# Patient Record
Sex: MALE
[De-identification: names, ages, dates, MRNs, and addresses within clinical notes are randomized per-mention and may not be internally consistent; named-entity substitution may affect disease eponyms.]

---

## 2022-01-31 ENCOUNTER — HOSPITAL ENCOUNTER (EMERGENCY)
Dept: HOSPITAL 5 - ED | Age: 37
Discharge: HOME | End: 2022-01-31
Payer: SELF-PAY

## 2022-01-31 VITALS — SYSTOLIC BLOOD PRESSURE: 127 MMHG | DIASTOLIC BLOOD PRESSURE: 80 MMHG

## 2022-01-31 DIAGNOSIS — S60.512A: ICD-10-CM
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
## 2022-01-31 DIAGNOSIS — F43.21: Primary | ICD-10-CM
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
## 2022-01-31 PROCEDURE — 99282 EMERGENCY DEPT VISIT SF MDM: CPT

## 2022-01-31 NOTE — EMERGENCY DEPARTMENT REPORT
ED General Adult HPI





- General


Chief complaint: Weakness


Stated complaint: MEDICAL SCREENING


Time Seen by Provider: 22 16:22


Source: patient


Mode of arrival: Ambulatory


Limitations: No Limitations





- History of Present Illness


Initial comments: 





Patient presents because his hands are red and swollen.  He actually came in 

because he was found shooting up drugs in a bathroom at [employer].  Police were

called.  Police offered prison versus the hospital.  The patient elected the 

hospital and EMS was called.  Patient states that his hands are red and swollen.

 Right is worse than left.  Is been going on for months.  He does not know what 

is causing it.  He was not shooting up in his hands.  At one point he was told 

that he had MRSA.  He was placed on some topical medicines which greatly 

improved his symptoms.  That was years ago.  He has had a recurrence of this 

recently.  He has no chest pain or shortness of breath.  Is no fevers or chills 

but is no cough or congestion.  Has no vomit or diarrhea.  He does report that 

he is still upset because his brother just  from endocarditis related to IV 

drug abuse.





- Related Data


                                  Previous Rx's











 Medication  Instructions  Recorded  Last Taken  Type


 


Doxycycline Monohydrate 100 mg PO BID #20 tab 22 Unknown Rx


 


Mupirocin [Bactroban 2%] 1 applic TP HS #1 tube 22 Unknown Rx














ED Review of Systems


ROS: 


Stated complaint: MEDICAL SCREENING


Other details as noted in HPI





Comment: All other systems reviewed and negative


Constitutional: denies: fever


Eyes: denies: eye pain


ENT: denies: throat pain


Respiratory: denies: cough


Cardiovascular: denies: chest pain


Endocrine: denies: unexplained weight loss


Gastrointestinal: denies: abdominal pain


Genitourinary: denies: dysuria


Musculoskeletal: denies: back pain


Skin: as per HPI


Neurological: denies: headache


Hematological/Lymphatic: denies: easy bruising





ED Past Medical Hx





- Past Medical History


Previous Medical History?: No


Additional medical history: IV drug abuse





- Surgical History


Past Surgical History?: No





- Family History


Family history: other (IV drug abuse)





- Medications


Home Medications: 


                                Home Medications











 Medication  Instructions  Recorded  Confirmed  Last Taken  Type


 


Doxycycline Monohydrate 100 mg PO BID #20 tab 22  Unknown Rx


 


Mupirocin [Bactroban 2%] 1 applic TP HS #1 tube 22  Unknown Rx














ED Physical Exam





- General


Limitations: No Limitations, Other (Pulse ox noted and normal)


General appearance: alert, in no apparent distress





- Head


Head exam: Present: atraumatic, normocephalic





- Eye


Eye exam: Present: normal appearance, EOMI





- ENT


ENT exam: Present: normal orophraynx, normal external ear exam





- Neck


Neck exam: Present: normal inspection.  Absent: meningismus





- Respiratory


Respiratory exam: Present: normal lung sounds bilaterally.  Absent: respiratory 

distress





- Cardiovascular


Cardiovascular Exam: Present: normal rhythm, tachycardia





- GI/Abdominal


GI/Abdominal exam: Present: soft





- Extremities Exam


Extremities exam: Present: normal capillary refill, other (Patient has multiple 

superficial abrasions to the dorsum of both hands with diffuse erythema.  

Patient keeps picking at his hands and stating that they are things coming out 

when he squeezes these pustules.  There are no visible pustules at this time.)





- Back Exam


Back exam: Absent: CVA tenderness (R), CVA tenderness (L)





- Neurological Exam


Neurological exam: Present: alert, oriented X3, CN II-XII intact, normal gait.  

Absent: motor sensory deficit





- Psychiatric


Psychiatric exam: Present: depressed (Tearful)





- Skin


Skin exam: Present: warm, dry





ED Course


                                   Vital Signs











  22





  16:17


 


Temperature 98.2 F


 


Pulse Rate 130 H


 


Respiratory 16





Rate 


 


Blood Pressure 127/80





[Left] 


 


O2 Sat by Pulse 96





Oximetry 














- Reevaluation(s)


Reevaluation #1: 





22 17:17


Patient was discharged





ED Medical Decision Making





- Medical Decision Making





Patient presents with hand erythema and multiple abrasions.  I suspect that this

 is likely formication related to his drug abuse, but he does seem to have some 

degree of cellulitis involving both hands.  He was placed on antibiotics.  He is

 tachycardic, but was crying and upset about his brother's death.  When the 

patient is calm, his heart rate was 100.  I do not believe this requires medical

 evaluation.  There was no murmur.


Critical Care Time: No


Critical care attestation.: 


If time is entered above; I have spent that time in minutes in the direct care 

of this critically ill patient, excluding procedure time.








ED Disposition


Clinical Impression: 


 Situational depression





Hand abrasion, infected


Qualifiers:


 Encounter type: initial encounter Laterality: left Qualified Code(s): S60.512A 

- Abrasion of left hand, initial encounter





Disposition:  HOME / SELF CARE / HOMELESS


Is pt being admited?: No


Condition: Stable


Instructions:  Abrasion, Wound Care, Adult, Complicated Grief


Additional Instructions: 


Keep the abrasions clean.  Return for problems.  Follow-up with your regular 

doctor or the on-call physician for recheck.  Seek help for your IV drug abuse.


Prescriptions: 


Mupirocin [Bactroban 2%] 1 applic TP HS #1 tube


Doxycycline Monohydrate 100 mg PO BID #20 tab


Referrals: 


PRIMARY CARE,MD [Primary Care Provider] - 3-5 Days


BRENDA HERNDON MD [Staff Physician] - 3-5 Days

## 2022-08-23 ENCOUNTER — RX ONLY (OUTPATIENT)
Age: 37
Setting detail: RX ONLY
End: 2022-08-23

## 2022-08-23 ENCOUNTER — APPOINTMENT (RX ONLY)
Dept: URBAN - METROPOLITAN AREA CLINIC 50 | Facility: CLINIC | Age: 37
Setting detail: DERMATOLOGY
End: 2022-08-23

## 2022-08-23 DIAGNOSIS — B35.2 TINEA MANUUM: ICD-10-CM

## 2022-08-23 PROCEDURE — ? KOH PREP

## 2022-08-23 PROCEDURE — ? PRESCRIPTION

## 2022-08-23 PROCEDURE — 99203 OFFICE O/P NEW LOW 30 MIN: CPT

## 2022-08-23 PROCEDURE — ? COUNSELING

## 2022-08-23 RX ORDER — CICLOPIROX OLAMINE 7.7 MG/G
THIN LAYER CREAM TOPICAL
Qty: 90 | Refills: 2 | Status: ERX | COMMUNITY
Start: 2022-08-23

## 2022-08-23 RX ORDER — TERBINAFINE HYDROCHLORIDE 250 MG/1
ONE TABLET ORAL QD
Qty: 14 | Refills: 0 | Status: ERX | COMMUNITY
Start: 2022-08-23

## 2022-08-23 RX ORDER — CLOBETASOL PROPIONATE 0.5 MG/G
THIN LAYER OINTMENT TOPICAL BID
Qty: 60 | Refills: 2 | Status: CANCELLED

## 2022-08-23 RX ADMIN — CICLOPIROX OLAMINE THIN LAYER: 7.7 CREAM TOPICAL at 00:00

## 2022-08-23 RX ADMIN — TERBINAFINE HYDROCHLORIDE ONE: 250 TABLET ORAL at 00:00

## 2022-08-23 ASSESSMENT — LOCATION SIMPLE DESCRIPTION DERM
LOCATION SIMPLE: RIGHT HAND
LOCATION SIMPLE: LEFT HAND

## 2022-08-23 ASSESSMENT — LOCATION DETAILED DESCRIPTION DERM
LOCATION DETAILED: RIGHT ULNAR DORSAL HAND
LOCATION DETAILED: RIGHT RADIAL DORSAL HAND
LOCATION DETAILED: LEFT ULNAR DORSAL HAND

## 2022-08-23 ASSESSMENT — LOCATION ZONE DERM: LOCATION ZONE: HAND

## 2022-09-06 ENCOUNTER — APPOINTMENT (RX ONLY)
Dept: URBAN - METROPOLITAN AREA CLINIC 50 | Facility: CLINIC | Age: 37
Setting detail: DERMATOLOGY
End: 2022-09-06

## 2022-09-06 ENCOUNTER — RX ONLY (OUTPATIENT)
Age: 37
Setting detail: RX ONLY
End: 2022-09-06

## 2022-09-06 DIAGNOSIS — B35.2 TINEA MANUUM: ICD-10-CM

## 2022-09-06 DIAGNOSIS — S31000A OPEN WOUND(S) (MULTIPLE) OF UNSPECIFIED SITE(S), WITHOUT MENTION OF COMPLICATION: ICD-10-CM

## 2022-09-06 PROBLEM — S01.401A UNSPECIFIED OPEN WOUND OF RIGHT CHEEK AND TEMPOROMANDIBULAR AREA, INITIAL ENCOUNTER: Status: ACTIVE | Noted: 2022-09-06

## 2022-09-06 PROCEDURE — ? TREATMENT REGIMEN

## 2022-09-06 PROCEDURE — 99213 OFFICE O/P EST LOW 20 MIN: CPT

## 2022-09-06 PROCEDURE — ? COUNSELING

## 2022-09-06 RX ORDER — TERBINAFINE HYDROCHLORIDE 250 MG/1
ONE TABLET ORAL QD
Qty: 14 | Refills: 0 | Status: ERX

## 2022-09-06 RX ORDER — MUPIROCIN 20 MG/G
1 OINTMENT TOPICAL BID
Qty: 22 | Refills: 1 | Status: ERX | COMMUNITY
Start: 2022-09-06

## 2022-09-06 ASSESSMENT — LOCATION DETAILED DESCRIPTION DERM
LOCATION DETAILED: RIGHT RADIAL DORSAL HAND
LOCATION DETAILED: LEFT ULNAR DORSAL HAND
LOCATION DETAILED: RIGHT INFERIOR LATERAL MALAR CHEEK

## 2022-09-06 ASSESSMENT — LOCATION SIMPLE DESCRIPTION DERM
LOCATION SIMPLE: RIGHT CHEEK
LOCATION SIMPLE: LEFT HAND
LOCATION SIMPLE: RIGHT HAND

## 2022-09-06 ASSESSMENT — LOCATION ZONE DERM
LOCATION ZONE: FACE
LOCATION ZONE: HAND

## 2022-09-06 NOTE — PROCEDURE: TREATMENT REGIMEN
Continue Regimen: CICLOPIROX 0.77 % topical cream \\nSig: Apply thin layer to affected areas twice daily x 6 weeks for 1 more month\\n\\nTERBINAFINE HCl 250 mg tablet Qd\\nSig: Take one tablet by mouth once daily x 14 days.  For two more weeks
Detail Level: Zone
Initiate Treatment: Mupirocin ointment BID x 10 days. \\nIf lesion does not heal plan to biopsy.

## 2024-03-28 ENCOUNTER — APPOINTMENT (RX ONLY)
Dept: URBAN - METROPOLITAN AREA CLINIC 50 | Facility: CLINIC | Age: 39
Setting detail: DERMATOLOGY
End: 2024-03-28

## 2024-03-28 DIAGNOSIS — L57.0 ACTINIC KERATOSIS: ICD-10-CM

## 2024-03-28 DIAGNOSIS — L82.1 OTHER SEBORRHEIC KERATOSIS: ICD-10-CM

## 2024-03-28 PROCEDURE — ? LIQUID NITROGEN

## 2024-03-28 PROCEDURE — ? SEPARATE AND IDENTIFIABLE DOCUMENTATION

## 2024-03-28 PROCEDURE — 17000 DESTRUCT PREMALG LESION: CPT

## 2024-03-28 PROCEDURE — ? COUNSELING

## 2024-03-28 PROCEDURE — 17003 DESTRUCT PREMALG LES 2-14: CPT

## 2024-03-28 PROCEDURE — 99213 OFFICE O/P EST LOW 20 MIN: CPT | Mod: 25

## 2024-03-28 ASSESSMENT — LOCATION ZONE DERM
LOCATION ZONE: NOSE
LOCATION ZONE: FACE

## 2024-03-28 ASSESSMENT — LOCATION SIMPLE DESCRIPTION DERM
LOCATION SIMPLE: NOSE
LOCATION SIMPLE: LEFT NOSE
LOCATION SIMPLE: RIGHT CHEEK

## 2024-03-28 ASSESSMENT — LOCATION DETAILED DESCRIPTION DERM
LOCATION DETAILED: NASAL DORSUM
LOCATION DETAILED: RIGHT INFERIOR CENTRAL MALAR CHEEK
LOCATION DETAILED: LEFT NASAL SIDEWALL

## 2024-03-28 NOTE — HPI: EVALUATION OF SKIN LESION(S)
What Type Of Note Output Would You Prefer (Optional)?: Standard Output
Hpi Title: Evaluation of Skin Lesions
How Severe Are Your Spot(S)?: moderate
Have Your Spot(S) Been Treated In The Past?: has not been treated
Additional History: Patient is here today for 2 spots of concerns on the face. He mentions the spot on the right side of his face for 5 years and grown in size. He states the spot on his nose has been there for about 2 years that is flaky.

## 2024-12-12 NOTE — HPI: RASH
What Type Of Note Output Would You Prefer (Optional)?: Standard Output
Is The Patient Presenting As Previously Scheduled?: Yes
How Severe Is Your Rash?: moderate
Is This A New Presentation, Or A Follow-Up?: Rash
Additional History: Patient works with his hands frequently and doesn’t work with many chemicals. Patient has not tried treating rash with anything other than using soap. Patient denies any joint pain or family history of psoriasis. Patient denies drinking alcohol or any other family members having similar rash. Patient does have dogs and cats at home.
EMS